# Patient Record
Sex: MALE | Race: OTHER | URBAN - METROPOLITAN AREA
[De-identification: names, ages, dates, MRNs, and addresses within clinical notes are randomized per-mention and may not be internally consistent; named-entity substitution may affect disease eponyms.]

---

## 2024-11-14 ENCOUNTER — TELEPHONE (OUTPATIENT)
Age: 24
End: 2024-11-14

## 2024-11-14 NOTE — TELEPHONE ENCOUNTER
Provider from [facility] office reached out to  and stated they would like this patient placed in the schedule on 11/22 @7:30am.    Rajeevr spoke with Intake Department Lead and needed to contact the patient to complete their chart and get the provider message and situation to the Intake Department.    Writer called and spoke with the patient.  Patients chart was completed minus the addition of their [employer], [employer] insurance.    Patient was emailed the link to the confirmed email in the chart and will set up Contractually to send pictures of both the front and back of their insurance card to be added into their chart for further information.    Writer stated the Intake Department will be made aware and reach out to assist more.

## 2024-11-14 NOTE — TELEPHONE ENCOUNTER
Patient called in looking for talk therapy and medication management services.    Writer started to create the patients chart and patient stated they lived in [place] and currently resided in [place] but may live in [place] in a couple of years.    Writer stated that patients must be in the stated of [place] even for virtual visits.    Patient stated they would contact their employer and work on receiving services through them.

## 2024-11-20 ENCOUNTER — TELEPHONE (OUTPATIENT)
Dept: PSYCHIATRY | Facility: CLINIC | Age: 24
End: 2024-11-20

## 2024-11-20 NOTE — TELEPHONE ENCOUNTER
One week follow up call for New Patient appointment with   Tobias Brand MD   on 11/22/2024 was made on 11/20/2024. Writer informed patient of New Patient paperwork needing to be completed 5 days prior to the appointment. Writer confirmed paperwork has been sent via [employer].    Appointment was made on: 11/15/2024

## 2024-11-22 ENCOUNTER — TELEPHONE (OUTPATIENT)
Dept: PSYCHIATRY | Facility: CLINIC | Age: 24
End: 2024-11-22

## 2024-11-22 ENCOUNTER — TELEMEDICINE (OUTPATIENT)
Dept: PSYCHIATRY | Facility: CLINIC | Age: 24
End: 2024-11-22
Payer: COMMERCIAL

## 2024-11-22 DIAGNOSIS — F90.0 ADHD (ATTENTION DEFICIT HYPERACTIVITY DISORDER), INATTENTIVE TYPE: Primary | ICD-10-CM

## 2024-11-22 PROCEDURE — 96127 BRIEF EMOTIONAL/BEHAV ASSMT: CPT | Performed by: STUDENT IN AN ORGANIZED HEALTH CARE EDUCATION/TRAINING PROGRAM

## 2024-11-22 PROCEDURE — 90792 PSYCH DIAG EVAL W/MED SRVCS: CPT | Performed by: STUDENT IN AN ORGANIZED HEALTH CARE EDUCATION/TRAINING PROGRAM

## 2024-11-22 RX ORDER — DEXTROAMPHETAMINE SACCHARATE, AMPHETAMINE ASPARTATE MONOHYDRATE, DEXTROAMPHETAMINE SULFATE AND AMPHETAMINE SULFATE 5; 5; 5; 5 MG/1; MG/1; MG/1; MG/1
20 CAPSULE, EXTENDED RELEASE ORAL EVERY MORNING
Qty: 30 CAPSULE | Refills: 0 | Status: SHIPPED | OUTPATIENT
Start: 2024-11-22
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
## 2024-11-22 NOTE — BH TREATMENT PLAN
TREATMENT PLAN (Medication Management Only)        [facility]      Name and Date of Birth:  Won Osborne 23 y.o. 2000 MRN: [id number]    Date of Visit: November 22, 2024    Diagnosis/Diagnoses:    1. ADHD (attention deficit hyperactivity disorder), inattentive type  amphetamine-dextroamphetamine (ADDERALL XR, 20MG,) 20 MG 24 hr capsule          Strengths/Personal Resources for Self-Care: supportive friends, taking medications as prescribed, ability to adapt to life changes, ability to communicate needs, ability to communicate well, ability to listen, ability to reason, ability to understand psychiatric illness, average or above intelligence, good understanding of illness, independence, motivation for treatment, ability to negotiate basic needs, being resoureceful, self-reliance, sense of humor, special hobby/interest, stable employment, well educated, willingness to work on problems.    Area/Areas of need (in own words): ADHD symptoms    1. Long Term Goal: maintain control of ADHD symptoms.  Target Date:6 months - 5/22/2025  Person/Persons responsible for completion of goal: Won    2.  Short Term Objective (s) - How will we reach this goal?:   A. Provider new recommended medication/dosage changes and/or continue medication(s): continue current medications as prescribed.  B. Keep regularly scheduled psychiatric appointments  C. Maintain adherence to psychotropic medication regimen   D. Exercise regularly (at least 30 mins)  E. Maintain appropriate dietary intake  F. Practice adequate sleep hygiene      Target Date:6 months - 5/22/2025  Person/Persons Responsible for Completion of Goal: Won    Progress Towards Goals: continuing treatment    Treatment Modality: medication management every 3 months    Review due 180 days from date of this plan: 6 months - 5/22/2025  Expected length of service: ongoing treatment    My Physician/PA/NP and I have developed  this plan together and I agree to work on the goals and objectives. I understand the treatment goals that were developed for my treatment. Treatment Plan was completed with Won's assistance and input throughout today's session. Won consented to the information provided and documented above. Unfortunately, treatment plan was not physically signed at the time of this office visit secondary to time constraints and issues related to signature keypad. Again, Won did verbally consent.
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
## 2025-02-20 ENCOUNTER — TELEMEDICINE (OUTPATIENT)
Dept: PSYCHIATRY | Facility: CLINIC | Age: 25
End: 2025-02-20
Payer: COMMERCIAL

## 2025-02-20 DIAGNOSIS — F90.0 ADHD (ATTENTION DEFICIT HYPERACTIVITY DISORDER), INATTENTIVE TYPE: ICD-10-CM

## 2025-02-20 PROCEDURE — 99213 OFFICE O/P EST LOW 20 MIN: CPT | Performed by: STUDENT IN AN ORGANIZED HEALTH CARE EDUCATION/TRAINING PROGRAM

## 2025-02-20 RX ORDER — DEXTROAMPHETAMINE SACCHARATE, AMPHETAMINE ASPARTATE MONOHYDRATE, DEXTROAMPHETAMINE SULFATE AND AMPHETAMINE SULFATE 5; 5; 5; 5 MG/1; MG/1; MG/1; MG/1
20 CAPSULE, EXTENDED RELEASE ORAL EVERY MORNING
Qty: 30 CAPSULE | Refills: 0 | Status: SHIPPED | OUTPATIENT
Start: 2025-02-20

## 2025-02-20 NOTE — ASSESSMENT & PLAN NOTE
Orders:    amphetamine-dextroamphetamine (ADDERALL XR, 20MG,) 20 MG 24 hr capsule; Take 1 capsule (20 mg total) by mouth every morning Max Daily Amount: 20 mg

## 2025-02-24 ENCOUNTER — TELEPHONE (OUTPATIENT)
Dept: PSYCHIATRY | Facility: CLINIC | Age: 25
End: 2025-02-24

## 2025-03-08 ENCOUNTER — DOCUMENTATION (OUTPATIENT)
Dept: PSYCHIATRY | Facility: CLINIC | Age: 25
End: 2025-03-08

## 2025-03-08 DIAGNOSIS — F90.0 ADHD (ATTENTION DEFICIT HYPERACTIVITY DISORDER), INATTENTIVE TYPE: ICD-10-CM

## 2025-03-08 RX ORDER — DEXTROAMPHETAMINE SACCHARATE, AMPHETAMINE ASPARTATE MONOHYDRATE, DEXTROAMPHETAMINE SULFATE AND AMPHETAMINE SULFATE 5; 5; 5; 5 MG/1; MG/1; MG/1; MG/1
20 CAPSULE, EXTENDED RELEASE ORAL EVERY MORNING
Qty: 90 CAPSULE | Refills: 0 | Status: SHIPPED | OUTPATIENT
Start: 2025-03-08

## 2025-03-16 NOTE — PSYCH
Won was seen and evaluated on 3/8/25 (in-person). Discussion was held regarding psychotropic medication management of ADHD concerns. Won reports benefit and tolerability associated with Adderall. No concerns regarding depression or lethality during the assessment. No alexander or psychosis. Supportive therapy and CBT utilized regarding some anxiety concerns regarding performance and past injuries. He was encouraged to send updated pharmacy records so I can submit his 2025 TUE Renewal to the [employer].

## 2025-06-10 DIAGNOSIS — F90.0 ADHD (ATTENTION DEFICIT HYPERACTIVITY DISORDER), INATTENTIVE TYPE: ICD-10-CM

## 2025-06-10 RX ORDER — DEXTROAMPHETAMINE SACCHARATE, AMPHETAMINE ASPARTATE MONOHYDRATE, DEXTROAMPHETAMINE SULFATE AND AMPHETAMINE SULFATE 5; 5; 5; 5 MG/1; MG/1; MG/1; MG/1
20 CAPSULE, EXTENDED RELEASE ORAL EVERY MORNING
Qty: 90 CAPSULE | Refills: 0 | Status: SHIPPED | OUTPATIENT
Start: 2025-06-10

## 2025-06-10 NOTE — PROGRESS NOTES
Won has been appropriately adherent to controlled psychotropic medications without evidence of abuse or misuse. As such, will send 90-day refill to pharmacy of choice and follow up as necessary.
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
## 2025-06-26 ENCOUNTER — TELEMEDICINE (OUTPATIENT)
Dept: PSYCHIATRY | Facility: CLINIC | Age: 25
End: 2025-06-26
Payer: COMMERCIAL

## 2025-06-26 DIAGNOSIS — F90.0 ADHD (ATTENTION DEFICIT HYPERACTIVITY DISORDER), INATTENTIVE TYPE: Primary | ICD-10-CM

## 2025-06-26 PROCEDURE — 90833 PSYTX W PT W E/M 30 MIN: CPT | Performed by: STUDENT IN AN ORGANIZED HEALTH CARE EDUCATION/TRAINING PROGRAM

## 2025-06-26 PROCEDURE — 99213 OFFICE O/P EST LOW 20 MIN: CPT | Performed by: STUDENT IN AN ORGANIZED HEALTH CARE EDUCATION/TRAINING PROGRAM

## 2025-06-26 NOTE — BH TREATMENT PLAN
TREATMENT PLAN (Medication Management Only)        [facility]      Name and Date of Birth:  Won Osborne 24 y.o. 2000 MRN: [id number]    Date of Visit: June 26, 2025    Diagnosis/Diagnoses:    1. ADHD (attention deficit hyperactivity disorder), inattentive type            Strengths/Personal Resources for Self-Care: supportive family, supportive friends, taking medications as prescribed, ability to adapt to life changes, ability to communicate needs, ability to communicate well, ability to listen, ability to reason, ability to understand psychiatric illness, average or above intelligence, general fund of knowledge, good physical health, good understanding of illness, independence, motivation for treatment, ability to negotiate basic needs, being resoureceful, self-reliance, special hobby/interest, stable employment, well educated, willingness to work on problems.    Area/Areas of need (in own words): ADHD symptoms    1. Long Term Goal: maintain control of ADHD symptoms.  Target Date:6 months - 12/26/2025  Person/Persons responsible for completion of goal: Won    2.  Short Term Objective (s) - How will we reach this goal?:   A. Provider new recommended medication/dosage changes and/or continue medication(s): continue current medications as prescribed.  B. Keep regularly scheduled psychiatric appointments  C. Maintain adherence to psychotropic medication regimen   D. Exercise regularly (at least 30 mins) - doing more cardio, trying to lose more weight  E. Maintain appropriate dietary intake  F. Practice adequate sleep hygiene      Target Date:6 months - 12/26/2025  Person/Persons Responsible for Completion of Goal: Won    Progress Towards Goals: continuing treatment    Treatment Modality: medication management every 3 months    Review due 180 days from date of this plan: 6 months - 12/26/2025  Expected length of service: ongoing treatment    My  Physician/PA/NP and I have developed this plan together and I agree to work on the goals and objectives. I understand the treatment goals that were developed for my treatment. Treatment Plan was completed with Won's assistance and input throughout today's session. Won consented to the information provided and documented above. Unfortunately, treatment plan was not physically signed at the time of this office visit secondary to time constraints and issues related to signature keypad. Again, Won did verbally consent.
